# Patient Record
Sex: FEMALE | Race: WHITE | NOT HISPANIC OR LATINO | Employment: FULL TIME | URBAN - METROPOLITAN AREA
[De-identification: names, ages, dates, MRNs, and addresses within clinical notes are randomized per-mention and may not be internally consistent; named-entity substitution may affect disease eponyms.]

---

## 2024-07-23 ENCOUNTER — OFFICE VISIT (OUTPATIENT)
Dept: SPORTS MEDICINE | Facility: CLINIC | Age: 54
End: 2024-07-23
Payer: COMMERCIAL

## 2024-07-23 VITALS
HEIGHT: 70 IN | HEART RATE: 88 BPM | TEMPERATURE: 98 F | DIASTOLIC BLOOD PRESSURE: 80 MMHG | BODY MASS INDEX: 35.07 KG/M2 | SYSTOLIC BLOOD PRESSURE: 132 MMHG | RESPIRATION RATE: 16 BRPM | WEIGHT: 245 LBS | OXYGEN SATURATION: 99 %

## 2024-07-23 DIAGNOSIS — M17.12 PRIMARY OSTEOARTHRITIS OF LEFT KNEE: ICD-10-CM

## 2024-07-23 DIAGNOSIS — E66.01 CLASS 2 SEVERE OBESITY DUE TO EXCESS CALORIES WITH SERIOUS COMORBIDITY AND BODY MASS INDEX (BMI) OF 35.0 TO 35.9 IN ADULT: ICD-10-CM

## 2024-07-23 DIAGNOSIS — M23.92 INTERNAL DERANGEMENT OF LEFT KNEE: ICD-10-CM

## 2024-07-23 DIAGNOSIS — M25.562 ACUTE PAIN OF LEFT KNEE: Primary | ICD-10-CM

## 2024-07-23 PROCEDURE — 99204 OFFICE O/P NEW MOD 45 MIN: CPT | Performed by: FAMILY MEDICINE

## 2024-07-23 NOTE — PROGRESS NOTES
"Gema is a 54 y.o. year old female presents to [facility]    Chief Complaint   Patient presents with    Knee Pain     Left knee pain for several months after twisting her knee. States knee will lock up on her.        History of Present Illness  History of Present Illness  The patient is a 54-year-old female who presents for evaluation of left knee pain.    She has been experiencing left knee pain for several months. The pain began suddenly, approximately 4 weeks ago, while she was attempting to put on shoes. She recalls a similar incident on 12/22/2023, which took about 2 months to heal. The pain persisted until early 2023, when she noticed a popping sensation and a sensation of her knee giving out. She used kinesiology tape to stabilize her knee, but discontinued its use in 05/2024. She was able to walk 7500 to 12,000 steps a day, which seemed to alleviate her knee tightness. However, about 4 weeks ago, her knee locked up above and below it. She reports swelling throughout the front and back of her knee. The pain is most severe when she extends or bends her knee, and sometimes at night. Sitting down puts more strain on her knee than standing up, causing discomfort both ways. The pain intensity varies, and she had to assist someone with standing or walking once on Saturday. She has been managing the pain with ibuprofen, Tylenol, and Biofreeze. She has had issues with her knee for most of her life, including a fall. She has not used kinesiology tape since 05/2024. She has lost a lot of weight over the last 1.5 years, and the brace she used did not fit well. She has not tried a brace this year.    I have reviewed the patient's medical, family, and social history in detail and updated the computerized patient record.    /80 (BP Location: Left arm, Patient Position: Sitting, Cuff Size: Adult)   Pulse 88   Temp 98 °F (36.7 °C)   Resp 16   Ht 177.8 cm (70\")   Wt 111 kg (245 " lb)   SpO2 99%   BMI 35.15 kg/m²      Physical Exam    Vital signs reviewed.   General: No acute distress.  Eyes: conjunctiva clear; pupils equally round and reactive  ENT: external ears atraumatic  CV: no peripheral edema  Resp: normal respiratory effort, no use of accessory muscles  Skin: no rashes or wounds; normal turgor  Psych: mood and affect appropriate; recent and remote memory intact  Neuro: sensation to light touch intact    MSK Exam  Physical Exam  In the left knee, there is no effusion, full range of motion, positive retropatellar crepitus. Negative medial, negative lateral Bela. No varus, no valgus laxity. Slightly antalgic gait.    Left Knee X-Ray  Indication: Pain    Views: AP, lateral, sunrise    Findings:  No fracture  No bony lesion  Normal soft tissues  Moderate tricompartmental osteoarthritis most notably within the patellofemoral compartment with osteophyte formation    No prior studies were available for comparison.      Results  Imaging  X-ray shows loss of space behind the kneecap and a formed a bone spur.         Diagnoses and all orders for this visit:    Acute pain of left knee  -     XR Knee 3+ View With Sunrise Left  -     MRI Knee Left Without Contrast; Future  -     Ambulatory Referral to Physical Therapy for Evaluation & Treatment    Internal derangement of left knee  -     MRI Knee Left Without Contrast; Future  -     Ambulatory Referral to Physical Therapy for Evaluation & Treatment    Primary osteoarthritis of left knee  -     MRI Knee Left Without Contrast; Future  -     Ambulatory Referral to Physical Therapy for Evaluation & Treatment    Class 2 severe obesity due to excess calories with serious comorbidity and body mass index (BMI) of 35.0 to 35.9 in adult      Assessment & Plan  1-4. Chronic left knee pain with internal derangement and primary osteoarthritis.  Discussed differential of her knee pain. She does have most notably patellofemoral and lateral knee OA on x-ray,  but she also has some early changes within the medial compartment. I explained that her symptoms of stiffness on the lateral leg near the knee joint could be sequela of her osteoarthritis. However, she could have underlying degenerative meniscal tear. Recommend MRI. She is interested in PT and will proceed in this direction. Discussed injection options, but these will be further assessed based on MRI results.    Follow-up  The patient will follow up once MRI is completed.          Follow Up     Patient was given instructions and counseling regarding her condition or for health maintenance advice. Please see specific information pulled into the AVS if appropriate.     Patient or patient representative verbalized consent for the use of Ambient Listening during the visit with  LAUREN Lindsey Jr., DO for chart documentation. 7/23/2024  13:10 EDT